# Patient Record
Sex: MALE | Race: WHITE | NOT HISPANIC OR LATINO | Employment: OTHER | URBAN - METROPOLITAN AREA
[De-identification: names, ages, dates, MRNs, and addresses within clinical notes are randomized per-mention and may not be internally consistent; named-entity substitution may affect disease eponyms.]

---

## 2017-03-28 ENCOUNTER — TELEPHONE (OUTPATIENT)
Dept: SURGERY | Facility: CLINIC | Age: 42
End: 2017-03-28

## 2017-03-28 DIAGNOSIS — C49.12: Primary | ICD-10-CM

## 2017-03-28 NOTE — TELEPHONE ENCOUNTER
----- Message from Hannah Penaloza sent at 3/28/2017 10:52 AM CDT -----  Contact: PT  Orders need to be faxed to [facility], for patient MRI. Patient did not have fax number.     His appointment will be on Monday.    Patient: [phone]

## 2017-04-04 ENCOUNTER — TELEPHONE (OUTPATIENT)
Dept: SURGERY | Facility: CLINIC | Age: 42
End: 2017-04-04

## 2017-04-18 ENCOUNTER — TELEPHONE (OUTPATIENT)
Dept: SURGERY | Facility: CLINIC | Age: 42
End: 2017-04-18

## 2017-06-30 ENCOUNTER — PATIENT MESSAGE (OUTPATIENT)
Dept: SURGERY | Facility: CLINIC | Age: 42
End: 2017-06-30

## 2017-07-05 ENCOUNTER — PATIENT MESSAGE (OUTPATIENT)
Dept: SURGERY | Facility: CLINIC | Age: 42
End: 2017-07-05

## 2018-03-20 ENCOUNTER — TELEPHONE (OUTPATIENT)
Dept: SURGERY | Facility: CLINIC | Age: 43
End: 2018-03-20

## 2018-03-20 DIAGNOSIS — C49.12: Primary | ICD-10-CM

## 2018-04-17 ENCOUNTER — TELEPHONE (OUTPATIENT)
Dept: SURGERY | Facility: CLINIC | Age: 43
End: 2018-04-17

## 2018-04-23 ENCOUNTER — TELEPHONE (OUTPATIENT)
Dept: SURGERY | Facility: CLINIC | Age: 43
End: 2018-04-23

## 2018-04-23 NOTE — TELEPHONE ENCOUNTER
----- Message from Wes Hernandez MD sent at 4/17/2018  1:08 PM CDT -----  One year. That will make five years.       ----- Message -----  From: Barbara Carmona RN  Sent: 4/17/2018  11:08 AM  To: Wes Hernandez MD    What is his next imaging?  6 months, 1 year, not at all?    Christina  ----- Message -----  From: Wes Hernandez MD  Sent: 4/16/2018   1:14 PM  To: Barbara Carmona RN    It looks good. Can you let him know?      ----- Message -----  From: Barbara Carmona RN  Sent: 4/12/2018  10:15 AM  To: Wes Hernandez MD    His annual surveillance scan and report (media) is uploaded.    Can you review and let me know if you call him or if you need me to give message?    LIZ Keenan

## 2019-03-26 ENCOUNTER — OFFICE VISIT (OUTPATIENT)
Dept: RHEUMATOLOGY | Facility: CLINIC | Age: 44
End: 2019-03-26
Payer: COMMERCIAL

## 2019-03-26 VITALS
HEART RATE: 55 BPM | SYSTOLIC BLOOD PRESSURE: 102 MMHG | WEIGHT: 136.44 LBS | DIASTOLIC BLOOD PRESSURE: 62 MMHG | BODY MASS INDEX: 20.68 KG/M2 | HEIGHT: 68 IN

## 2019-03-26 DIAGNOSIS — M79.671 RIGHT FOOT PAIN: ICD-10-CM

## 2019-03-26 DIAGNOSIS — K21.9 GASTROESOPHAGEAL REFLUX DISEASE, ESOPHAGITIS PRESENCE NOT SPECIFIED: Chronic | ICD-10-CM

## 2019-03-26 DIAGNOSIS — M67.40 GANGLION CYST: ICD-10-CM

## 2019-03-26 DIAGNOSIS — C49.12 SOFT TISSUE SARCOMA OF LEFT SHOULDER: Primary | ICD-10-CM

## 2019-03-26 PROCEDURE — 99202 OFFICE O/P NEW SF 15 MIN: CPT | Mod: 25,S$GLB,, | Performed by: INTERNAL MEDICINE

## 2019-03-26 PROCEDURE — 3008F BODY MASS INDEX DOCD: CPT | Mod: CPTII,S$GLB,, | Performed by: INTERNAL MEDICINE

## 2019-03-26 PROCEDURE — 20612 ASPIRATE/INJ GANGLION CYST: CPT | Mod: RT,S$GLB,, | Performed by: INTERNAL MEDICINE

## 2019-03-26 PROCEDURE — 20612 GANGLION CYST: ICD-10-PCS | Mod: RT,S$GLB,, | Performed by: INTERNAL MEDICINE

## 2019-03-26 PROCEDURE — 99999 PR PBB SHADOW E&M-EST. PATIENT-LVL III: ICD-10-PCS | Mod: PBBFAC,,, | Performed by: INTERNAL MEDICINE

## 2019-03-26 PROCEDURE — 99202 PR OFFICE/OUTPT VISIT, NEW, LEVL II, 15-29 MIN: ICD-10-PCS | Mod: 25,S$GLB,, | Performed by: INTERNAL MEDICINE

## 2019-03-26 PROCEDURE — 3008F PR BODY MASS INDEX (BMI) DOCUMENTED: ICD-10-PCS | Mod: CPTII,S$GLB,, | Performed by: INTERNAL MEDICINE

## 2019-03-26 PROCEDURE — 99999 PR PBB SHADOW E&M-EST. PATIENT-LVL III: CPT | Mod: PBBFAC,,, | Performed by: INTERNAL MEDICINE

## 2019-03-26 RX ORDER — METHYLPREDNISOLONE ACETATE 80 MG/ML
10 INJECTION, SUSPENSION INTRA-ARTICULAR; INTRALESIONAL; INTRAMUSCULAR; SOFT TISSUE
Status: ACTIVE | OUTPATIENT
Start: 2019-03-26

## 2019-03-26 RX ORDER — OMEPRAZOLE 40 MG/1
CAPSULE, DELAYED RELEASE ORAL
Qty: 90 CAPSULE | Refills: 3 | Status: SHIPPED | OUTPATIENT
Start: 2019-03-26 | End: 2020-05-07

## 2019-03-26 RX ORDER — BETAMETHASONE SODIUM PHOSPHATE AND BETAMETHASONE ACETATE 3; 3 MG/ML; MG/ML
1.5 INJECTION, SUSPENSION INTRA-ARTICULAR; INTRALESIONAL; INTRAMUSCULAR; SOFT TISSUE
Status: ACTIVE | OUTPATIENT
Start: 2019-03-26

## 2019-03-26 RX ORDER — OMEPRAZOLE 40 MG/1
40 CAPSULE, DELAYED RELEASE ORAL DAILY
COMMUNITY
End: 2019-03-26 | Stop reason: SDUPTHER

## 2019-03-26 NOTE — PROCEDURES
Ganglion Cyst  Date/Time: 3/26/2019 12:24 PM  Performed by: Mahamed Tan MD  Authorized by: Mahamed Tan MD     Consent Done?:  Yes (Verbal)  Indications:  Pain and joint swelling  Site marked: The procedure site was marked    Timeout: Prior to procedure the correct patient, procedure, and site was verified      Location: R mid foot.  Prep: Patient was prepped and draped in usual sterile fashion    Ultrasonic Guidance for needle placement: No  Needle size:  25 G  Approach:  Dorsal  Patient tolerance:  Patient tolerated the procedure well with no immediate complications     Additional Comments: Procedure note: After verbal consent was obtained. The  R lateral foot above 5th MTP- cuboid  joint  was prepared with sterile prep.  The skin was anesthetized with 1% ethyl chloride.  The ganglion cyst was injected with 1 cc of 1% lidocaine to anesthetize the area.  Aspiration was attempted but no fluid could be removed The cyst was then injected with 0.3 mL celestone/soluspan 6 mg/5 mL, 0.25 mL Depo-Medrol 80 mg/mL and 0.25 mL of 1 % lidocaine.  Hemostasis was obtained.  The patient tolerated procedure well with no complications.  Discharge and  icing instructions given to patient.

## 2019-03-26 NOTE — PATIENT INSTRUCTIONS
Ok for Prilosec- take in am    Tylenol preferred for joint pain;  If no help can use aleve    CBD oil or other topical ok to apply to skin over cyst area if hurting    Ice area for 15 min 2-3 x day     Call if any issues

## 2019-04-07 PROBLEM — M79.671 RIGHT FOOT PAIN: Status: ACTIVE | Noted: 2019-04-07

## 2019-04-07 NOTE — PROGRESS NOTES
Subjective:       Patient ID: Rika Gooden is a 43 y.o. male.    Chief Complaint:  Right foot pain with a cyst-pain score 2/10    HPI   Rika is a 43-year-old  gentleman who has a recurring cyst in his right foot laterally.  It started About 2-3 years ago with a tender lump but grew to a significant size and had to be injected by a physician in [facility].  It quit hurting then and has done well until the last several months when it started to increase in size and having pain.  It seems to be bothered when he is on his feet.  He works as a  and during the season he is on his feet continually.  The left foot does not causing any difficulty.  The cystic area is not hot red or associated with any numbness or tingling.  The ankle moves well.  The ankle is not swollen.  The MTPs are not swollen.  There is no dactylitis.  He does have occasional knee pains and a pop when he walks on occasion but they are not swell.  He has not had a popliteal cyst.  He has some back pain particularly flare 4 years ago though not daily back pain.  He did develop a this moist sarcoma dose tumor in the left shoulder several years ago.  That was removed in January 2013 and so for follow-up has not recurred.  He gets yearly MRIs.  HEENT he denies any ganglion cyst in his upper extremities or any upper extremity synovitis or ongoing symptoms.    Review of Systems   Constitutional: Negative for chills, fever and weight loss.        Prior sarcoma this tumor in the left shoulder-resected 2013 without recurrence   HENT: Negative for congestion and sinus pain.    Eyes: Negative for pain, discharge and redness.   Respiratory: Negative for cough, shortness of breath and wheezing.    Cardiovascular: Negative for chest pain, palpitations and PND.   Gastrointestinal: Positive for heartburn. Negative for diarrhea and nausea.        Frequent reflux-uses Prilosec   Genitourinary: Negative for dysuria and frequency.    Musculoskeletal: Positive for joint pain and myalgias. Negative for back pain.        See HPI-right foot lateral pain with ganglion type cyst   Skin: Negative for itching and rash.   Neurological: Negative for dizziness and focal weakness.   Endo/Heme/Allergies: Negative.    Psychiatric/Behavioral: Negative.  Negative for depression and substance abuse. The patient is not nervous/anxious.      Past Medical History:   Diagnosis Date    GERD (gastroesophageal reflux disease)     Kidney stone      Past Surgical History:   Procedure Laterality Date    EXCISIONAL BIOPSY Left 1/17/2014    Performed by Wes Hernandez MD at [facility] OR [address] [place] [address]    FEMUR SURGERY      HERNIA REPAIR      RESECTION-MASS Left 1/24/2014    Performed by Wes Hernandez MD at [facility] OR [address] [place] [address]    WRIST SURGERY       History reviewed. No pertinent family history.  Social History     Socioeconomic History    Marital status:      Spouse name: Not on file    Number of children: Not on file    Years of education: Not on file    Highest education level: Not on file   Occupational History    Not on file   Social Needs    Financial resource strain: Not on file    Food insecurity:     Worry: Not on file     Inability: Not on file    Transportation needs:     Medical: Not on file     Non-medical: Not on file   Tobacco Use    Smoking status: Current Every Day Smoker     Packs/day: 1.00     Years: 20.00     Pack years: 20.00     Types: Cigarettes    Smokeless tobacco: Never Used   Substance and Sexual Activity    Alcohol use: Yes     Alcohol/week: 0.0 oz     Comment: Social    Drug use: No    Sexual activity: Yes   Lifestyle    Physical activity:     Days per week: Not on file     Minutes per session: Not on file    Stress: Not on file   Relationships    Social connections:     Talks on phone: Not on file     Gets together: Not on file     Attends Islam service: Not on file     Active member of club or organization: Not on file     " Attends meetings of clubs or organizations: Not on file     Relationship status: Not on file   Other Topics Concern    Not on file   Social History Narrative    Not on file     Review of patient's allergies indicates:  No Known Allergies      Objective:   /62   Pulse (!) 55   Ht 5' 8" (1.727 m)   Wt 61.9 kg (136 lb 7.4 oz)   BMI 20.75 kg/m²       Physical Exam   Constitutional: He is oriented to person, place, and time. He appears well-developed and well-nourished.   HENT:   Head: Normocephalic.   Nose: Nose normal.   Eyes: Pupils are equal, round, and reactive to light. EOM are normal.   Neck: Normal range of motion. Neck supple.   Cardiovascular: Normal rate and regular rhythm. Exam reveals no gallop.   No murmur heard.  Pulmonary/Chest: Effort normal and breath sounds normal. He has no wheezes. He has no rales.   Abdominal: Soft. There is no tenderness. There is no rebound.   Musculoskeletal: Normal range of motion. He exhibits no edema or tenderness.   Small ganglion type cyst in the right lateral foot at the junction of the cuboid bone with the metatarsal head.  It is tender but not red or hot or warm.  The ankle has normal range of motion is does subtalar joint without synovitis or tenderness.    MTPs are normal.  Plantar fascia and Achilles insertions are normal.    The left foot has no significant abnormalities.  Both knees have mild crepitus but good motion without swelling.  Hips move well without tenderness straight leg raises are negative.    Upper extremities with no synovitis with good motion at the PIP MPs and wrist.   Lymphadenopathy:     He has no cervical adenopathy.   Neurological: He is alert and oriented to person, place, and time.   Skin: Skin is warm and dry. No rash noted.   Psychiatric: He has a normal mood and affect. His behavior is normal.     MRI Upper Extremity Joint W Wo Contrast Left  Narrative: Technique: Multiplanar, multi-sequence images were obtained through the left " shoulder before and after administration of 13 cc multihance IV contrast.    Comparison: Left shoulder MRI 4/30/14.    Findings: Postsurgical changes are again visualized in the left shoulder posterior to the scapular spine consistent with sarcoma resection.  There is mild edema without significant enhancement seen in the surgical bed, overall improved compared to   previous MRI from 4/30/14.  No evidence of nodular or masslike enhancement to suggest recurrent tumor.  Small metal artifact is again seen in the region of the surgical bed.  No evidence to suggest marrow replacement process.  The visualized cord   demonstrates no signal abnormality.  No abnormal fluid collections are seen.  Shoulder demonstrates no gross abnormality to suggest internal derangement.  Impression: Improved edema/inflammation in the surgical bed without evidence to suggest recurrent disease.  ______________________________________     Electronically signed by resident: Leonel Lopez MD  Date:     12/03/14  Time:    16:58    As the supervising and teaching physician, I personally reviewed the images and resident's interpretation and I agree with the findings.    Electronically signed by: Vimal Benton MD  Date:     12/03/14  Time:    17:09     Assessment:     Encounter Diagnoses   Name Primary?   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  Aspirate and inject the right lateral foot ganglion cyst-see procedure note    2.  Try to wear shoe inserts for the arches to decrease stress on the lateral foot    3.  Use Tylenol for pain avoid nonsteroidals in view of his reflux    4.  Okay for Prilosec    5.  If continues to recur consider surgical removal of cyst    6.CBD oil or other  topical ok to apply to skin over cyst area if hurting     7. Ice area for 15 min 2-3 x day     Call if any issues

## 2020-05-07 DIAGNOSIS — K21.9 GASTROESOPHAGEAL REFLUX DISEASE, ESOPHAGITIS PRESENCE NOT SPECIFIED: Chronic | ICD-10-CM

## 2020-05-07 RX ORDER — OMEPRAZOLE 40 MG/1
CAPSULE, DELAYED RELEASE ORAL
Qty: 90 CAPSULE | Refills: 3 | Status: SHIPPED | OUTPATIENT
Start: 2020-05-07